# Patient Record
Sex: MALE | Race: WHITE | NOT HISPANIC OR LATINO | URBAN - METROPOLITAN AREA
[De-identification: names, ages, dates, MRNs, and addresses within clinical notes are randomized per-mention and may not be internally consistent; named-entity substitution may affect disease eponyms.]

---

## 2018-02-03 ENCOUNTER — EMERGENCY (EMERGENCY)
Facility: HOSPITAL | Age: 28
LOS: 1 days | Discharge: HOME | End: 2018-02-03
Attending: EMERGENCY MEDICINE | Admitting: EMERGENCY MEDICINE
Payer: COMMERCIAL

## 2018-02-03 VITALS
SYSTOLIC BLOOD PRESSURE: 125 MMHG | HEART RATE: 68 BPM | WEIGHT: 175.05 LBS | DIASTOLIC BLOOD PRESSURE: 80 MMHG | RESPIRATION RATE: 18 BRPM | TEMPERATURE: 98 F | HEIGHT: 67 IN

## 2018-02-03 VITALS — OXYGEN SATURATION: 99 % | RESPIRATION RATE: 18 BRPM

## 2018-02-03 PROCEDURE — 99283 EMERGENCY DEPT VISIT LOW MDM: CPT

## 2018-02-03 RX ORDER — METHOCARBAMOL 500 MG/1
750 TABLET, FILM COATED ORAL ONCE
Qty: 0 | Refills: 0 | Status: COMPLETED | OUTPATIENT
Start: 2018-02-03 | End: 2018-02-03

## 2018-02-03 RX ORDER — METHOCARBAMOL 500 MG/1
2 TABLET, FILM COATED ORAL
Qty: 30 | Refills: 0 | OUTPATIENT
Start: 2018-02-03 | End: 2018-02-07

## 2018-02-03 RX ADMIN — METHOCARBAMOL 750 MILLIGRAM(S): 500 TABLET, FILM COATED ORAL at 13:37

## 2018-02-03 NOTE — ED STATDOCS - OBJECTIVE STATEMENT
26 y/o male with no PMHx presents to the ED for doctor's note. Pt c/o back pain and describes the pain as tight onset 5 days ago. Pt went to Chiropractor yesterday where he got adjusted and felt relief. Denies burning urination or blood in urine. No further complaints at this time. 26 y/o male with no PMHx presents to the ED for doctor's note. Pt c/o back pain and describes the pain as tight onset 5 days ago. Pt was recently diagnosed with sciatica. Pt went to Chiropractor yesterday where he got adjusted and felt relief. Denies burning urination or blood in urine. No further complaints at this time. This patient is a 26 y/o man with no PMHx who presents to the ED c/o back pain.  Patient describes the pain as tightness in the lower back pain onset 5 days ago.  He denies injury or trauma.  Pt was recently diagnosed with sciatica and went to Chiropractor yesterday where he got adjusted and felt relief.  Patient came to the ER describing the tightness and requesting a note for work.  He denies dysuria, hematuria, and fever.  No further complaints at this time.

## 2018-02-03 NOTE — ED STATDOCS - MUSCULOSKELETAL, MLM
range of motion is not limited and there is no muscle tenderness. Good ROM at lumbar spine. No CVA tenderness. Ambulatory.

## 2018-02-03 NOTE — ED ADULT NURSE NOTE - OBJECTIVE STATEMENT
pt alert and awake x3, arrived to ED with lower back pain radiates down leg, states it happened at work, ambulates with steady gait, denies n/v/d, denies trauma to back, denies chest pain/sob.

## 2018-02-03 NOTE — ED STATDOCS - MEDICAL DECISION MAKING DETAILS
Pt describes tightening in back. Will medicate and is instructed to take anti inflammatory medication at home.

## 2018-02-04 PROBLEM — Z00.00 ENCOUNTER FOR PREVENTIVE HEALTH EXAMINATION: Status: ACTIVE | Noted: 2018-02-04

## 2019-04-12 NOTE — ED ADULT NURSE NOTE - DOES PATIENT HAVE ADVANCE DIRECTIVE
Both offices attempted to contact the pt for scheduling.  Per Dr. Juanjo Dunn office request I have re-sent the referral along with the MRI report for review and they will reach out to the pt for scheduling No

## 2019-07-17 ENCOUNTER — TRANSCRIPTION ENCOUNTER (OUTPATIENT)
Age: 29
End: 2019-07-17

## 2023-03-06 ENCOUNTER — APPOINTMENT (OUTPATIENT)
Dept: ORTHOPEDIC SURGERY | Facility: CLINIC | Age: 33
End: 2023-03-06
Payer: COMMERCIAL

## 2023-03-06 VITALS — WEIGHT: 170 LBS | HEIGHT: 67 IN | BODY MASS INDEX: 26.68 KG/M2

## 2023-03-06 DIAGNOSIS — Z78.9 OTHER SPECIFIED HEALTH STATUS: ICD-10-CM

## 2023-03-06 PROCEDURE — 99203 OFFICE O/P NEW LOW 30 MIN: CPT

## 2023-03-06 PROCEDURE — 72170 X-RAY EXAM OF PELVIS: CPT

## 2023-03-06 PROCEDURE — 72100 X-RAY EXAM L-S SPINE 2/3 VWS: CPT

## 2023-03-06 RX ORDER — METHYLPREDNISOLONE 4 MG/1
4 TABLET ORAL
Qty: 1 | Refills: 0 | Status: ACTIVE | COMMUNITY
Start: 2023-03-06 | End: 1900-01-01

## 2023-03-06 NOTE — PHYSICAL EXAM
[Disc space narrowing] : Disc space narrowing [AP] : anteroposterior [There are no fractures, subluxations or dislocations. No significant abnormalities are seen] : There are no fractures, subluxations or dislocations. No significant abnormalities are seen [] : non-antalgic [FreeTextEntry1] : ddd worst at L5/S1, then L4/5 [TWNoteComboBox7] : forward flexion 60 degrees [de-identified] : extension 20 degrees [de-identified] : left lateral bending 25 degrees [de-identified] : left lateral rotation 25 degrees

## 2023-03-06 NOTE — HISTORY OF PRESENT ILLNESS
[Lower back] : lower back [Dull/Aching] : dull/aching [Tingling] : tingling [Constant] : constant [de-identified] : 33 yo M here for eval of b/l low back pain for about a month. Pain started after bending over to pick something up. Patient feels that he pulled his back and has been feeling pain since. Radiation into buttock/ hip with associated N/t. No radiation down the legs. no weakness, no b/b changes. Tried heat and tylenol/ advil. \par \par h/o sciatica 3 yrs ago txd with [facility]\par \par [employer] [] : no [FreeTextEntry3] : 1 month

## 2023-04-21 ENCOUNTER — APPOINTMENT (OUTPATIENT)
Dept: ORTHOPEDIC SURGERY | Facility: CLINIC | Age: 33
End: 2023-04-21
Payer: COMMERCIAL

## 2023-04-21 DIAGNOSIS — M51.37 OTHER INTERVERTEBRAL DISC DEGENERATION, LUMBOSACRAL REGION: ICD-10-CM

## 2023-04-21 DIAGNOSIS — M54.50 LOW BACK PAIN, UNSPECIFIED: ICD-10-CM

## 2023-04-21 PROCEDURE — 99213 OFFICE O/P EST LOW 20 MIN: CPT

## 2023-04-21 NOTE — HISTORY OF PRESENT ILLNESS
[Lower back] : lower back [0] : 0 [de-identified] : 31 yo M here for eval of b/l low back pain for about a month. Pain started after bending over to pick something up. Patient feels that he pulled his back and has been feeling pain since. Radiation into buttock/ hip with associated N/t. No radiation down the legs. no weakness, no b/b changes. Tried heat and tylenol/ advil. \par \par h/o sciatica 3 yrs ago txd with [facility]\par \par [employer] [Dull/Aching] : dull/aching [Tingling] : tingling [Constant] : constant [] : no [FreeTextEntry3] : 1 month
